# Patient Record
Sex: MALE | Race: BLACK OR AFRICAN AMERICAN | Employment: OTHER | URBAN - METROPOLITAN AREA
[De-identification: names, ages, dates, MRNs, and addresses within clinical notes are randomized per-mention and may not be internally consistent; named-entity substitution may affect disease eponyms.]

---

## 2022-09-28 DIAGNOSIS — E29.1 TESTICULAR HYPOFUNCTION: Primary | ICD-10-CM

## 2022-09-28 RX ORDER — TESTOSTERONE CYPIONATE 200 MG/ML
200 INJECTION INTRAMUSCULAR ONCE
Qty: 1 ML | Refills: 0 | Status: CANCELLED | OUTPATIENT
Start: 2022-09-28 | End: 2022-09-28

## 2022-09-28 RX ORDER — TESTOSTERONE CYPIONATE 200 MG/ML
200 INJECTION INTRAMUSCULAR
Qty: 2 ML | Refills: 5 | Status: SHIPPED | OUTPATIENT
Start: 2022-09-28 | End: 2023-03-02

## 2023-02-17 DIAGNOSIS — E29.1 TESTICULAR HYPOFUNCTION: Primary | ICD-10-CM

## 2023-02-17 DIAGNOSIS — N52.9 ERECTILE DYSFUNCTION, UNSPECIFIED ERECTILE DYSFUNCTION TYPE: ICD-10-CM

## 2023-02-17 DIAGNOSIS — E34.9 ENDOCRINE DISORDER, UNSPECIFIED: ICD-10-CM

## 2023-03-01 ENCOUNTER — OFFICE VISIT (OUTPATIENT)
Dept: UROLOGY | Age: 68
End: 2023-03-01
Payer: MEDICARE

## 2023-03-01 DIAGNOSIS — N52.9 ERECTILE DYSFUNCTION, UNSPECIFIED ERECTILE DYSFUNCTION TYPE: ICD-10-CM

## 2023-03-01 DIAGNOSIS — R35.1 NOCTURIA: ICD-10-CM

## 2023-03-01 DIAGNOSIS — E29.1 TESTICULAR HYPOFUNCTION: Primary | ICD-10-CM

## 2023-03-01 DIAGNOSIS — E29.1 TESTICULAR HYPOFUNCTION: ICD-10-CM

## 2023-03-01 LAB
BILIRUBIN, URINE, POC: NEGATIVE
BLOOD URINE, POC: NORMAL
ERYTHROCYTE [DISTWIDTH] IN BLOOD BY AUTOMATED COUNT: 14.6 % (ref 11.9–14.6)
GLUCOSE URINE, POC: 1000
HCT VFR BLD AUTO: 53 % (ref 41.1–50.3)
HGB BLD-MCNC: 17.3 G/DL (ref 13.6–17.2)
KETONES, URINE, POC: NEGATIVE
LEUKOCYTE ESTERASE, URINE, POC: NEGATIVE
MCH RBC QN AUTO: 27.2 PG (ref 26.1–32.9)
MCHC RBC AUTO-ENTMCNC: 32.6 G/DL (ref 31.4–35)
MCV RBC AUTO: 83.5 FL (ref 82–102)
NITRITE, URINE, POC: NEGATIVE
NRBC # BLD: 0 K/UL (ref 0–0.2)
PH, URINE, POC: 6 (ref 4.6–8)
PLATELET # BLD AUTO: 272 K/UL (ref 150–450)
PMV BLD AUTO: 10.1 FL (ref 9.4–12.3)
PROTEIN,URINE, POC: 30
RBC # BLD AUTO: 6.35 M/UL (ref 4.23–5.6)
SPECIFIC GRAVITY, URINE, POC: 1.01 (ref 1–1.03)
URINALYSIS CLARITY, POC: NORMAL
URINALYSIS COLOR, POC: NORMAL
UROBILINOGEN, POC: NORMAL
WBC # BLD AUTO: 6.4 K/UL (ref 4.3–11.1)

## 2023-03-01 PROCEDURE — 81003 URINALYSIS AUTO W/O SCOPE: CPT | Performed by: NURSE PRACTITIONER

## 2023-03-01 PROCEDURE — 99214 OFFICE O/P EST MOD 30 MIN: CPT | Performed by: NURSE PRACTITIONER

## 2023-03-01 PROCEDURE — 1123F ACP DISCUSS/DSCN MKR DOCD: CPT | Performed by: NURSE PRACTITIONER

## 2023-03-01 RX ORDER — TESTOSTERONE CYPIONATE 200 MG/ML
200 VIAL (ML) INTRAMUSCULAR
Qty: 6 ML | Refills: 5 | Status: SHIPPED | OUTPATIENT
Start: 2023-03-01 | End: 2023-03-03

## 2023-03-01 ASSESSMENT — ENCOUNTER SYMPTOMS: EYES NEGATIVE: 1

## 2023-03-01 NOTE — PROGRESS NOTES
[facility]  [address] [place] [address] Ne, [address]  [phone]          Megan   : 1955    Chief Complaint   Patient presents with    Follow-up          HPI     Megan [place] is a 76 y.o. male      Returns today for 1 year follow-up on low testosterone. Patient is currently using testosterone cypionate intramuscularly 200 mg every 14 days. At one point he was using Testopel but his insurance stopped covering this. He does well with the every 2-week injections. He is feeling well. He continues to have issues with ED but he does use super Trimix and has success with erections. He uses 40-50 units as needed. Blood work is due to be obtained today. He did not come in last week for blood work. He had his last testosterone injection 1 week ago today. Past Medical History:   Diagnosis Date    Diabetes ([address]     Erectile dysfunction     Herpes simplex     Hypercholesterolemia     Hypertension     Hypotestosteronemia     Ill-defined condition     high cholesterol     Past Surgical History:   Procedure Laterality Date    OTHER SURGICAL HISTORY      hernia    OTHER SURGICAL HISTORY Right     shoulder    UROLOGICAL SURGERY      lithotripsy     Current Outpatient Medications   Medication Sig Dispense Refill    Papav-Phentolamine-Alprostadil (SUPER TRI-MIX) 150- MG-MG-MCG SOLR 1 each by IntraCAVernosal route as needed (erectile dysfunction) PGE1: 10 mcg/ml; Papaverine: 30 mg/ml, Phentolamine: 1 mg/ml;  + all other supplies as needed. 1 each 11    Testosterone Cypionate 200 MG/ML SOLN Inject 200 mg as directed every 14 days Max Daily Amount: 200 mg 6 mL 5    Syringe/Needle, Disp, (SYRINGE 3CC/71NQ8-9/2\") 18G X 1-1/2\" 3 ML MISC Pt will use as prescribed for injection therapy.   Disp-1 box with 5 refills 1 each 5    Needle, Disp, (HYPODERMIC NEEDLE 23GX1\") 23G X 1\" MISC 1 box by Does not apply route once a week 1 each 5    testosterone cypionate (Marleny Severino CYPIONATE) 200 MG/ML injection Inject 1 mL into the muscle every 14 days for 12 doses. 2 mL 5    Syringe/Needle, Disp, (SYRINGE 3CC/02WB9-2/2\") 18G X 1-1/2\" 3 ML MISC 3 ml syringe with 18 g needle to draw up and needs a 21g needle to administer 100 each 3    amLODIPine (NORVASC) 5 MG tablet Take 10 mg by mouth daily      empagliflozin (JARDIANCE) 10 MG tablet Jardiance 10 mg tablet      glipiZIDE (GLUCOTROL) 5 MG tablet Take 10 mg by mouth 2 times daily      lisinopril-hydroCHLOROthiazide (PRINZIDE;ZESTORETIC) 20-25 MG per tablet Take 1 tablet by mouth daily      metFORMIN (GLUCOPHAGE) 850 MG tablet Take 850 mg by mouth 3 times daily      pravastatin (PRAVACHOL) 80 MG tablet Take 80 mg by mouth      SITagliptin (JANUVIA) 100 MG tablet Januvia 100 mg tablet TAKE ONE TABLET BY MOUTH ONE TIME DAILY       No current facility-administered medications for this visit. No Known Allergies  Social History     Socioeconomic History    Marital status:      Spouse name: Not on file    Number of children: Not on file    Years of education: Not on file    Highest education level: Not on file   Occupational History    Not on file   Tobacco Use    Smoking status: Never    Smokeless tobacco: Not on file   Substance and Sexual Activity    Alcohol use: No    Drug use: No    Sexual activity: Not on file   Other Topics Concern    Not on file   Social History Narrative    Not on file     Social Determinants of Health     Financial Resource Strain: Not on file   Food Insecurity: Not on file   Transportation Needs: Not on file   Physical Activity: Not on file   Stress: Not on file   Social Connections: Not on file   Intimate Partner Violence: Not on file   Housing Stability: Not on file     No family history on file. Review of Systems  Constitutional: Negative  Skin: Negative skin ROS  Eyes: Eyes negative  ENT: HENT negative  Cardiovascular: Positive for hypertension and leg swelling.   GI: Neg GI ROS  Genitourinary: Positive for erectile dysfunction. Musculoskeletal: Musculoskeletal negative  Neurological: Neg neuro ROS  Psychological: Neg psych ROS  Endocrine: Endocrine negative  Hem/Lymphatic: Hematologic/lymphatic negative    Urinalysis  UA - Dipstick  Results for orders placed or performed in visit on 03/01/23   AMB POC URINALYSIS DIP STICK AUTO W/O MICRO   Result Value Ref Range    Color (UA POC)      Clarity (UA POC)      Glucose, Urine, POC 1000 Negative    Bilirubin, Urine, POC Negative Negative    KETONES, Urine, POC Negative Negative    Specific Gravity, Urine, POC 1.010 1.001 - 1.035    Blood (UA POC) Trace Negative    pH, Urine, POC 6.0 4.6 - 8.0    Protein, Urine, POC 30 Negative    Urobilinogen, POC Normal     Nitrite, Urine, POC Negative Negative    Leukocyte Esterase, Urine, POC Negative Negative       PHYSICAL EXAM    GENERAL: No acute distress, Awake, Alert, Oriented X 3, Gait normal  ABDOMEN: soft, non tender, non-distended, positive bowel sounds, no organomegaly, no palpable masses, no guarding, no rebound tenderness  SKIN: No rash, no erythema, no lacerations or abrasions, no ecchymosis  MUSCULOSKELETAL - MAEW, no edema       Assessment and Plan    ICD-10-CM    1. Testicular hypofunction  E29.1 AMB POC URINALYSIS DIP STICK AUTO W/O MICRO     Testosterone Cypionate 200 MG/ML SOLN     Hepatic Function Panel     Testosterone, free, total     CBC     PSA, Diagnostic      2. Erectile dysfunction, unspecified erectile dysfunction type  N52.9 Hepatic Function Panel     Testosterone, free, total     CBC     PSA, Diagnostic      3. Nocturia  R35.1         Low testosterone -  Well controlled with testosterone cypionate 200 mg every 14 days. He will continue current dose. We will adjust dose if needing per blood work. CBC, PSA, hepatic panel and also testosterone will be obtained today. ED-  Well controlled with Trimix injection therapy.   He will was given refill on medication and he will take this to compounding pharmacy. Follow up in 1 year    LUCERO Hooker NP, Dr. is supervising physician today and he approves plan of care. Return in about 1 year (around 3/1/2024). Elements of this note have been dictated using speech recognition software. Although reviewed, errors of speech recognition may have occurred.

## 2023-03-02 LAB
ALBUMIN SERPL-MCNC: 4.2 G/DL (ref 3.2–4.6)
ALBUMIN/GLOB SERPL: 1.2 (ref 0.4–1.6)
ALP SERPL-CCNC: 61 U/L (ref 50–136)
ALT SERPL-CCNC: 43 U/L (ref 12–65)
AST SERPL-CCNC: 19 U/L (ref 15–37)
BILIRUB DIRECT SERPL-MCNC: <0.1 MG/DL
BILIRUB SERPL-MCNC: 0.2 MG/DL (ref 0.2–1.1)
GLOBULIN SER CALC-MCNC: 3.6 G/DL (ref 2.8–4.5)
PROT SERPL-MCNC: 7.8 G/DL (ref 6.3–8.2)
PSA SERPL-MCNC: 1.1 NG/ML

## 2023-03-03 RX ORDER — TESTOSTERONE CYPIONATE 200 MG/ML
200 VIAL (ML) INTRAMUSCULAR
Qty: 6 ML | Refills: 5 | Status: SHIPPED | OUTPATIENT
Start: 2023-03-03

## 2023-03-08 LAB
TESTOST FREE SERPL-MCNC: 27.8 PG/ML (ref 6.6–18.1)
TESTOST SERPL-MCNC: 1000 NG/DL (ref 264–916)

## 2023-04-24 ENCOUNTER — TELEPHONE (OUTPATIENT)
Dept: UROLOGY | Age: 68
End: 2023-04-24

## 2023-04-24 DIAGNOSIS — E34.9 ENDOCRINE DISORDER, UNSPECIFIED: ICD-10-CM

## 2023-04-24 DIAGNOSIS — N52.9 ERECTILE DYSFUNCTION, UNSPECIFIED ERECTILE DYSFUNCTION TYPE: ICD-10-CM

## 2023-04-24 DIAGNOSIS — E29.1 TESTICULAR HYPOFUNCTION: ICD-10-CM

## 2023-04-24 LAB
ALBUMIN SERPL-MCNC: 4.2 G/DL (ref 3.2–4.6)
ALBUMIN/GLOB SERPL: 1.2 (ref 0.4–1.6)
ALP SERPL-CCNC: 69 U/L (ref 50–136)
ALT SERPL-CCNC: 55 U/L (ref 12–65)
AST SERPL-CCNC: 23 U/L (ref 15–37)
BILIRUB DIRECT SERPL-MCNC: 0.1 MG/DL
BILIRUB SERPL-MCNC: 0.4 MG/DL (ref 0.2–1.1)
ERYTHROCYTE [DISTWIDTH] IN BLOOD BY AUTOMATED COUNT: 14.3 % (ref 11.9–14.6)
GLOBULIN SER CALC-MCNC: 3.4 G/DL (ref 2.8–4.5)
HCT VFR BLD AUTO: 49.4 % (ref 41.1–50.3)
HGB BLD-MCNC: 16 G/DL (ref 13.6–17.2)
MCH RBC QN AUTO: 27.4 PG (ref 26.1–32.9)
MCHC RBC AUTO-ENTMCNC: 32.4 G/DL (ref 31.4–35)
MCV RBC AUTO: 84.7 FL (ref 82–102)
NRBC # BLD: 0 K/UL (ref 0–0.2)
PLATELET # BLD AUTO: 314 K/UL (ref 150–450)
PMV BLD AUTO: 9.8 FL (ref 9.4–12.3)
PROT SERPL-MCNC: 7.6 G/DL (ref 6.3–8.2)
RBC # BLD AUTO: 5.83 M/UL (ref 4.23–5.6)
WBC # BLD AUTO: 5.6 K/UL (ref 4.3–11.1)

## 2023-04-24 NOTE — TELEPHONE ENCOUNTER
Pt came to the office he got a recent Rx for tri mix .   The pt brought in previous rx and it was for super tri mix  30/2/20 he would like a new Rx sent to pharmacy for the super tri mix //dh

## 2023-04-25 LAB
PSA SERPL-MCNC: 1 NG/ML
TESTOST SERPL-MCNC: 276 NG/DL (ref 264–916)

## 2023-10-02 DIAGNOSIS — E29.1 TESTICULAR HYPOFUNCTION: ICD-10-CM

## 2023-10-03 RX ORDER — TESTOSTERONE CYPIONATE 200 MG/ML
INJECTION, SOLUTION INTRAMUSCULAR
Qty: 6 ML | Refills: 5 | Status: SHIPPED | OUTPATIENT
Start: 2023-10-03 | End: 2024-03-31

## 2023-10-04 DIAGNOSIS — E29.1 TESTICULAR HYPOFUNCTION: ICD-10-CM

## 2024-03-19 ENCOUNTER — OFFICE VISIT (OUTPATIENT)
Dept: UROLOGY | Age: 69
End: 2024-03-19
Payer: MEDICARE

## 2024-03-19 DIAGNOSIS — R35.1 NOCTURIA: ICD-10-CM

## 2024-03-19 DIAGNOSIS — E29.1 TESTICULAR HYPOFUNCTION: Primary | ICD-10-CM

## 2024-03-19 LAB
ALBUMIN SERPL-MCNC: 4.4 G/DL (ref 3.2–4.6)
ALBUMIN/GLOB SERPL: 1.3 (ref 0.4–1.6)
ALP SERPL-CCNC: 83 U/L (ref 50–136)
ALT SERPL-CCNC: 49 U/L (ref 12–65)
AST SERPL-CCNC: 27 U/L (ref 15–37)
BILIRUB DIRECT SERPL-MCNC: <0.1 MG/DL
BILIRUB SERPL-MCNC: 0.4 MG/DL (ref 0.2–1.1)
ERYTHROCYTE [DISTWIDTH] IN BLOOD BY AUTOMATED COUNT: 14.3 % (ref 11.9–14.6)
GLOBULIN SER CALC-MCNC: 3.4 G/DL (ref 2.8–4.5)
HCT VFR BLD AUTO: 53.1 % (ref 41.1–50.3)
HGB BLD-MCNC: 16.9 G/DL (ref 13.6–17.2)
MCH RBC QN AUTO: 26.7 PG (ref 26.1–32.9)
MCHC RBC AUTO-ENTMCNC: 31.8 G/DL (ref 31.4–35)
MCV RBC AUTO: 83.9 FL (ref 82–102)
NRBC # BLD: 0 K/UL (ref 0–0.2)
PLATELET # BLD AUTO: 307 K/UL (ref 150–450)
PMV BLD AUTO: 10.8 FL (ref 9.4–12.3)
PROT SERPL-MCNC: 7.8 G/DL (ref 6.3–8.2)
PSA SERPL-MCNC: 1.2 NG/ML
RBC # BLD AUTO: 6.33 M/UL (ref 4.23–5.6)
WBC # BLD AUTO: 5.9 K/UL (ref 4.3–11.1)

## 2024-03-19 PROCEDURE — 1123F ACP DISCUSS/DSCN MKR DOCD: CPT | Performed by: NURSE PRACTITIONER

## 2024-03-19 PROCEDURE — 99214 OFFICE O/P EST MOD 30 MIN: CPT | Performed by: NURSE PRACTITIONER

## 2024-03-19 RX ORDER — TESTOSTERONE CYPIONATE 200 MG/ML
INJECTION, SOLUTION INTRAMUSCULAR
Qty: 6 ML | Refills: 5 | Status: SHIPPED | OUTPATIENT
Start: 2024-03-19 | End: 2024-09-17

## 2024-03-19 ASSESSMENT — ENCOUNTER SYMPTOMS
NAUSEA: 0
BACK PAIN: 0

## 2024-03-19 NOTE — PROGRESS NOTES
Partner Violence: Not on file   Housing Stability: Not on file     No family history on file.    Review of Systems  Constitutional:   Negative for fever.  GI:  Negative for nausea.  Musculoskeletal:  Negative for back pain.      Urinalysis  UA - Dipstick  Results for orders placed or performed in visit on 03/01/23   AMB POC URINALYSIS DIP STICK AUTO W/O MICRO   Result Value Ref Range    Color (UA POC)      Clarity (UA POC)      Glucose, Urine, POC 1000 Negative    Bilirubin, Urine, POC Negative Negative    KETONES, Urine, POC Negative Negative    Specific Gravity, Urine, POC 1.010 1.001 - 1.035    Blood (UA POC) Trace Negative    pH, Urine, POC 6.0 4.6 - 8.0    Protein, Urine, POC 30 Negative    Urobilinogen, POC Normal     Nitrite, Urine, POC Negative Negative    Leukocyte Esterase, Urine, POC Negative Negative         Assessment and Plan    ICD-10-CM    1. Testicular hypofunction  E29.1 CBC     Hepatic Function Panel     Testosterone Total Only, Male     Syringe/Needle, Disp, (SYRINGE 3CC/38OY6-3/2\") 18G X 1-1/2\" 3 ML MISC     testosterone cypionate (DEPOTESTOTERONE CYPIONATE) 200 MG/ML injection     Testosterone Total Only, Male     Hepatic Function Panel     CBC      2. Nocturia  R35.1 PSA, Diagnostic     PSA, Diagnostic        Low testosterone-  Well-controlled with testosterone cypionate IM every 14 days 200 mg.  He is due blood work today we will do hepatic panel, testosterone level and CBC.  Refills of medication with supplies will be sent to pharmacy    Nocturia-  Currently controlled.  PSA will be obtained today.    ED-  Well-controlled with super Trimix.  He is taking 30 mg / 2 mg/20 mcg.    Orders Placed This Encounter    CBC     Standing Status:   Future     Number of Occurrences:   1     Standing Expiration Date:   9/19/2025    Hepatic Function Panel     Standing Status:   Future     Number of Occurrences:   1     Standing Expiration Date:   3/19/2025    Testosterone Total Only, Male     Standing

## 2024-03-21 LAB — TESTOST SERPL-MCNC: 170 NG/DL (ref 264–916)

## 2024-10-03 DIAGNOSIS — E29.1 TESTICULAR HYPOFUNCTION: ICD-10-CM

## 2024-10-03 RX ORDER — TESTOSTERONE CYPIONATE 200 MG/ML
INJECTION, SOLUTION INTRAMUSCULAR
Qty: 6 ML | Refills: 2 | Status: SHIPPED | OUTPATIENT
Start: 2024-10-03 | End: 2025-04-14

## 2025-03-19 ENCOUNTER — OFFICE VISIT (OUTPATIENT)
Dept: UROLOGY | Age: 70
End: 2025-03-19

## 2025-03-19 DIAGNOSIS — E29.1 TESTICULAR HYPOFUNCTION: ICD-10-CM

## 2025-03-19 DIAGNOSIS — R35.1 NOCTURIA: Primary | ICD-10-CM

## 2025-03-19 DIAGNOSIS — N52.9 ERECTILE DYSFUNCTION, UNSPECIFIED ERECTILE DYSFUNCTION TYPE: ICD-10-CM

## 2025-03-19 DIAGNOSIS — R35.1 NOCTURIA: ICD-10-CM

## 2025-03-19 LAB
ALBUMIN SERPL-MCNC: 4.1 G/DL (ref 3.2–4.6)
ALBUMIN/GLOB SERPL: 1.3 (ref 1–1.9)
ALP SERPL-CCNC: 65 U/L (ref 40–129)
ALT SERPL-CCNC: 36 U/L (ref 8–55)
AST SERPL-CCNC: 26 U/L (ref 15–37)
BILIRUB DIRECT SERPL-MCNC: <0.2 MG/DL (ref 0–0.4)
BILIRUB SERPL-MCNC: 0.3 MG/DL (ref 0–1.2)
ERYTHROCYTE [DISTWIDTH] IN BLOOD BY AUTOMATED COUNT: 13.7 % (ref 11.9–14.6)
GLOBULIN SER CALC-MCNC: 3.1 G/DL (ref 2.3–3.5)
HCT VFR BLD AUTO: 50.8 % (ref 41.1–50.3)
HGB BLD-MCNC: 16 G/DL (ref 13.6–17.2)
MCH RBC QN AUTO: 26.8 PG (ref 26.1–32.9)
MCHC RBC AUTO-ENTMCNC: 31.5 G/DL (ref 31.4–35)
MCV RBC AUTO: 84.9 FL (ref 82–102)
NRBC # BLD: 0 K/UL (ref 0–0.2)
PLATELET # BLD AUTO: 292 K/UL (ref 150–450)
PMV BLD AUTO: 10.2 FL (ref 9.4–12.3)
PROT SERPL-MCNC: 7.2 G/DL (ref 6.3–8.2)
PSA SERPL-MCNC: 1.4 NG/ML (ref 0–4)
RBC # BLD AUTO: 5.98 M/UL (ref 4.23–5.6)
WBC # BLD AUTO: 5.5 K/UL (ref 4.3–11.1)

## 2025-03-19 RX ORDER — METOPROLOL SUCCINATE 100 MG/1
TABLET, EXTENDED RELEASE ORAL
COMMUNITY

## 2025-03-19 RX ORDER — LOSARTAN POTASSIUM 100 MG/1
TABLET ORAL
COMMUNITY

## 2025-03-19 RX ORDER — TESTOSTERONE CYPIONATE 200 MG/ML
INJECTION, SOLUTION INTRAMUSCULAR
Qty: 6 ML | Refills: 2 | Status: SHIPPED | OUTPATIENT
Start: 2025-03-19 | End: 2025-09-28

## 2025-03-19 ASSESSMENT — ENCOUNTER SYMPTOMS
BACK PAIN: 0
NAUSEA: 0

## 2025-03-19 NOTE — PROGRESS NOTES
[facility]  [address] [place] [id number]  [phone]          Jah Bautista  : 1955    Chief Complaint   Patient presents with    Follow-up          HPI     Jah Bautista is a 70 y.o. male  Here today for yearly follow-up on low testosterone, nocturia and also ED.  He is taking testosterone cypionate 200 mg every 3 weeks.  It had been ordered every 2 weeks but he says he feels that he can go every 3 weeks without needing injection.  He is due for blood work today.  He will also need refills on injections.    Nocturia overall is stable.  He says occasionally he is getting up 3 times a night but typically is only up twice a night he does not feel that he needs any medication for the nocturia.  He is due PSA today.    As far as ED is concerned he has been using Trimix and reports he is only getting about 40% erection with that.  He has titrated up to 0.7 mL.  He is going to discuss any other options.        Past Medical History:   Diagnosis Date    Diabetes (HCC)     Erectile dysfunction     Herpes simplex     Hypercholesterolemia     Hypertension     Hypotestosteronemia     Ill-defined condition     high cholesterol     Past Surgical History:   Procedure Laterality Date    OTHER SURGICAL HISTORY      hernia    OTHER SURGICAL HISTORY Right     shoulder    UROLOGICAL SURGERY      lithotripsy     Current Outpatient Medications   Medication Sig Dispense Refill    losartan (COZAAR) 100 MG tablet losartan 100 mg tablet   TAKE ONE TABLET BY MOUTH ONE TIME DAILY      metoprolol succinate (TOPROL XL) 100 MG extended release tablet metoprolol succinate  mg tablet,extended release 24 hr   TAKE ONE TABLET BY MOUTH ONE TIME DAILY      Syringe/Needle, Disp, (SYRINGE 3CC/28HM3-7/2\") 18G X 1-1/2\" 3 ML MISC Pt will use as prescribed for injection therapy.  Disp-1 box with 5 refills 1 each 5    Syringe/Needle, Disp, (SYRINGE 3CC/58GQ6-6/2\") 18G X 1-1/2\" 3 ML MISC 3 ml syringe

## 2025-03-20 LAB — TESTOST SERPL-MCNC: 150 NG/DL (ref 264–916)

## 2025-06-07 DIAGNOSIS — E29.1 TESTICULAR HYPOFUNCTION: Primary | ICD-10-CM

## 2025-06-09 RX ORDER — TESTOSTERONE CYPIONATE 200 MG/ML
INJECTION, SOLUTION INTRAMUSCULAR
Qty: 6 ML | Refills: 1 | Status: SHIPPED | OUTPATIENT
Start: 2025-06-09 | End: 2025-12-06